# Patient Record
Sex: FEMALE | Race: WHITE | HISPANIC OR LATINO | URBAN - METROPOLITAN AREA
[De-identification: names, ages, dates, MRNs, and addresses within clinical notes are randomized per-mention and may not be internally consistent; named-entity substitution may affect disease eponyms.]

---

## 2020-02-25 ENCOUNTER — APPOINTMENT (RX ONLY)
Dept: URBAN - METROPOLITAN AREA CLINIC 15 | Facility: CLINIC | Age: 35
Setting detail: DERMATOLOGY
End: 2020-02-25

## 2020-02-25 DIAGNOSIS — L90.8 OTHER ATROPHIC DISORDERS OF SKIN: ICD-10-CM

## 2020-02-25 PROCEDURE — ? FACIAL

## 2020-02-25 ASSESSMENT — LOCATION ZONE DERM: LOCATION ZONE: NOSE

## 2020-02-25 ASSESSMENT — LOCATION SIMPLE DESCRIPTION DERM: LOCATION SIMPLE: LEFT NOSE

## 2020-02-25 ASSESSMENT — LOCATION DETAILED DESCRIPTION DERM: LOCATION DETAILED: LEFT NARIS

## 2020-02-25 NOTE — PROCEDURE: FACIAL
Treatment Type Override: deep pore cleansing facial
Exfoliation Type: microdermabrasion
Price (Use Numbers Only, No Special Characters Or $): 125.00
Facial Steaming: steamed
Extraction Method: extractor
Mask Type (Optional): calming
Detail Level: Zone
Treatment Type (Optional): Sensitive

## 2020-10-08 ENCOUNTER — APPOINTMENT (RX ONLY)
Dept: URBAN - METROPOLITAN AREA CLINIC 15 | Facility: CLINIC | Age: 35
Setting detail: DERMATOLOGY
End: 2020-10-08

## 2020-10-08 DIAGNOSIS — L90.8 OTHER ATROPHIC DISORDERS OF SKIN: ICD-10-CM

## 2020-10-08 PROCEDURE — ? FACIAL

## 2020-10-08 ASSESSMENT — LOCATION ZONE DERM: LOCATION ZONE: FACE

## 2020-10-08 ASSESSMENT — LOCATION DETAILED DESCRIPTION DERM: LOCATION DETAILED: RIGHT MEDIAL MALAR CHEEK

## 2020-10-08 ASSESSMENT — LOCATION SIMPLE DESCRIPTION DERM: LOCATION SIMPLE: RIGHT CHEEK

## 2020-10-08 NOTE — PROCEDURE: FACIAL
Facial Steaming: steamed
Extraction Method: extractor
Mask Type (Optional): erik-based
Price (Use Numbers Only, No Special Characters Or $): 155.00
Exfoliation Type: intensive scrub
Detail Level: Zone
Treatment Type (Optional): Deep Cleanse Treatment
Treatment Type Override: deep pore cleansing facial
Exfoliation Type Override: exfoliator

## 2021-02-10 ENCOUNTER — APPOINTMENT (RX ONLY)
Dept: URBAN - METROPOLITAN AREA CLINIC 15 | Facility: CLINIC | Age: 36
Setting detail: DERMATOLOGY
End: 2021-02-10

## 2021-02-10 DIAGNOSIS — L90.8 OTHER ATROPHIC DISORDERS OF SKIN: ICD-10-CM

## 2021-02-10 PROCEDURE — ? FACIAL

## 2021-02-10 ASSESSMENT — LOCATION DETAILED DESCRIPTION DERM: LOCATION DETAILED: LEFT INFERIOR MEDIAL MALAR CHEEK

## 2021-02-10 ASSESSMENT — LOCATION SIMPLE DESCRIPTION DERM: LOCATION SIMPLE: LEFT CHEEK

## 2021-02-10 ASSESSMENT — LOCATION ZONE DERM: LOCATION ZONE: FACE

## 2021-02-10 NOTE — PROCEDURE: FACIAL
Extraction Method: extractor
Mask Type (Optional): erik-based
Exfoliation Type: microdermabrasion
Price (Use Numbers Only, No Special Characters Or $): 125.00
Treatment Type (Optional): Deep Cleanse Treatment
Exfoliation Type Override: exfoliator/ microdermabrasion
Detail Level: Zone
Facial Steaming: steamed
Treatment Type Override: deep pore cleansing facial

## 2021-05-12 ENCOUNTER — APPOINTMENT (RX ONLY)
Dept: URBAN - METROPOLITAN AREA CLINIC 15 | Facility: CLINIC | Age: 36
Setting detail: DERMATOLOGY
End: 2021-05-12

## 2021-05-12 DIAGNOSIS — L90.8 OTHER ATROPHIC DISORDERS OF SKIN: ICD-10-CM

## 2021-05-12 PROCEDURE — ? FACIAL

## 2021-05-12 ASSESSMENT — LOCATION SIMPLE DESCRIPTION DERM: LOCATION SIMPLE: LEFT LIP

## 2021-05-12 ASSESSMENT — LOCATION DETAILED DESCRIPTION DERM: LOCATION DETAILED: LEFT UPPER CUTANEOUS LIP

## 2021-05-12 ASSESSMENT — LOCATION ZONE DERM: LOCATION ZONE: LIP

## 2021-05-12 NOTE — PROCEDURE: FACIAL
Mask Type (Optional): erik-based
Extraction Method: extractor
Price (Use Numbers Only, No Special Characters Or $): 139.50
Treatment Type Override: deep pore cleansing facial
Exfoliation Type Override: exfoliator/microderm
Detail Level: Zone
Treatment Type (Optional): Deep Cleanse Treatment
Exfoliation Type: microdermabrasion
Facial Steaming: steamed

## 2021-08-03 ENCOUNTER — APPOINTMENT (RX ONLY)
Dept: URBAN - METROPOLITAN AREA CLINIC 15 | Facility: CLINIC | Age: 36
Setting detail: DERMATOLOGY
End: 2021-08-03

## 2021-08-03 DIAGNOSIS — L90.8 OTHER ATROPHIC DISORDERS OF SKIN: ICD-10-CM

## 2021-08-03 PROCEDURE — ? FACIAL

## 2021-08-03 ASSESSMENT — LOCATION DETAILED DESCRIPTION DERM: LOCATION DETAILED: LEFT SUPERIOR MEDIAL BUCCAL CHEEK

## 2021-08-03 ASSESSMENT — LOCATION ZONE DERM: LOCATION ZONE: FACE

## 2021-08-03 ASSESSMENT — LOCATION SIMPLE DESCRIPTION DERM: LOCATION SIMPLE: LEFT CHEEK

## 2021-08-03 NOTE — PROCEDURE: FACIAL
Detail Level: Zone
Exfoliation Type Override: exfoliator/microdermabrasion
Treatment Type (Optional): Deep Cleanse Treatment
Exfoliation Type: microdermabrasion
Mask Type (Optional): gel based
Extraction Method: extractor
Facial Steaming: steamed
Treatment Type Override: deep pore cleansing facial

## 2021-10-05 ENCOUNTER — APPOINTMENT (RX ONLY)
Dept: URBAN - METROPOLITAN AREA CLINIC 15 | Facility: CLINIC | Age: 36
Setting detail: DERMATOLOGY
End: 2021-10-05

## 2021-10-05 DIAGNOSIS — L90.8 OTHER ATROPHIC DISORDERS OF SKIN: ICD-10-CM

## 2021-10-05 PROCEDURE — ? FACIAL

## 2021-10-05 ASSESSMENT — LOCATION SIMPLE DESCRIPTION DERM: LOCATION SIMPLE: LEFT CHEEK

## 2021-10-05 ASSESSMENT — LOCATION DETAILED DESCRIPTION DERM: LOCATION DETAILED: LEFT LATERAL MALAR CHEEK

## 2021-10-05 ASSESSMENT — LOCATION ZONE DERM: LOCATION ZONE: FACE

## 2021-10-05 NOTE — PROCEDURE: FACIAL
Detail Level: Zone
Treatment Type Override: deep pore cleansing facial
Extraction Method: extractor
Exfoliation Type Override: microdermabrasion
Treatment Type (Optional): Deep Cleanse Treatment
Mask Type (Optional): gel based
Facial Steaming: steamed

## 2022-01-27 ENCOUNTER — APPOINTMENT (RX ONLY)
Dept: URBAN - METROPOLITAN AREA CLINIC 15 | Facility: CLINIC | Age: 37
Setting detail: DERMATOLOGY
End: 2022-01-27

## 2022-01-27 DIAGNOSIS — L90.8 OTHER ATROPHIC DISORDERS OF SKIN: ICD-10-CM

## 2022-01-27 PROCEDURE — ? FACIAL

## 2022-01-27 ASSESSMENT — LOCATION DETAILED DESCRIPTION DERM: LOCATION DETAILED: LEFT SUPERIOR MEDIAL MALAR CHEEK

## 2022-01-27 ASSESSMENT — LOCATION ZONE DERM: LOCATION ZONE: FACE

## 2022-01-27 ASSESSMENT — LOCATION SIMPLE DESCRIPTION DERM: LOCATION SIMPLE: LEFT CHEEK

## 2022-01-27 NOTE — PROCEDURE: FACIAL
Led Light (Optional): red
Detail Level: Zone
Facial Steaming: steamed
Exfoliation Type: microdermabrasion
Treatment Serum (Optional): PhytoCorrect
Treatment Type (Optional): Deep Cleansing Facial
Mask Type (Optional): vitamin C
Extraction Method: extractor

## 2022-04-12 ENCOUNTER — APPOINTMENT (RX ONLY)
Dept: URBAN - METROPOLITAN AREA CLINIC 15 | Facility: CLINIC | Age: 37
Setting detail: DERMATOLOGY
End: 2022-04-12

## 2022-04-12 DIAGNOSIS — L90.8 OTHER ATROPHIC DISORDERS OF SKIN: ICD-10-CM

## 2022-04-12 PROCEDURE — ? FACIAL

## 2022-04-12 ASSESSMENT — LOCATION DETAILED DESCRIPTION DERM: LOCATION DETAILED: NASAL ROOT

## 2022-04-12 ASSESSMENT — LOCATION ZONE DERM: LOCATION ZONE: NOSE

## 2022-04-12 ASSESSMENT — LOCATION SIMPLE DESCRIPTION DERM: LOCATION SIMPLE: NOSE

## 2022-04-12 NOTE — PROCEDURE: FACIAL
Exfoliation Type: microdermabrasion
Led Light (Optional): red
Treatment Serum (Optional): PhytoCorrect
Extraction Method: extractor
Detail Level: Zone
Mask Type (Optional): vitamin C
Treatment Type (Optional): Deep Cleansing Facial
Facial Steaming: steamed